# Patient Record
Sex: FEMALE | Race: WHITE
[De-identification: names, ages, dates, MRNs, and addresses within clinical notes are randomized per-mention and may not be internally consistent; named-entity substitution may affect disease eponyms.]

---

## 2017-04-23 ENCOUNTER — HOSPITAL ENCOUNTER (EMERGENCY)
Dept: HOSPITAL 62 - ER | Age: 27
Discharge: HOME | End: 2017-04-23
Payer: COMMERCIAL

## 2017-04-23 VITALS — SYSTOLIC BLOOD PRESSURE: 118 MMHG | DIASTOLIC BLOOD PRESSURE: 74 MMHG

## 2017-04-23 DIAGNOSIS — K02.9: ICD-10-CM

## 2017-04-23 DIAGNOSIS — K08.89: Primary | ICD-10-CM

## 2017-04-23 PROCEDURE — S0119 ONDANSETRON 4 MG: HCPCS

## 2017-04-23 PROCEDURE — 99282 EMERGENCY DEPT VISIT SF MDM: CPT

## 2017-04-23 NOTE — ER DOCUMENT REPORT
HPI





- HPI


Patient complains to provider of: dental pain


Onset: Other - Several days


Onset/Duration: Gradual


Pain Level: 5


Context: 


27-year-old female with dental decay is complaining increased pain in the right 

upper molars.  No fever or chills.  No facial swelling.


Associated Symptoms: None


Exacerbated by: Denies


Relieved by: Denies


Similar symptoms previously: Yes


Recently seen / treated by doctor: No





- ROS


ROS below otherwise negative: Yes


Systems Reviewed and Negative: Yes All other systems reviewed and negative





- DERM


Skin Color: Normal





Past Medical History





- General


Information source: Patient





- Social History


Smoking Status: Never Smoker


Frequency of alcohol use: None


Drug Abuse: None


Family History: Reviewed & Not Pertinent


Patient has suicidal ideation: No


Patient has homicidal ideation: No





- Medical History


Medical History: Negative


Renal/ Medical History: Denies: Hx Peritoneal Dialysis


Surgical Hx: Negative





Vertical Provider Document





- CONSTITUTIONAL


Agree With Documented VS: Yes


Exam Limitations: No Limitations





- INFECTION CONTROL


TRAVEL OUTSIDE OF THE [place] IN LAST 30 DAYS: No





- HEENT


HEENT: Normocephalic


Notes: 


Multiple decayed teeth with severe gingivitis top right molars no abscess 

palpated





- NECK


Neck: Supple.  negative: Lymphadenopathy-Left, Lymphadenopathy-Right





- RESPIRATORY


O2 Sat by Pulse Oximetry: 99





- MUSCULOSKELETAL/EXTREMETIES


Musculoskeletal/Extremeties: MAEW, FROM





- NEURO


Level of Consciousness: Awake, Alert, Appropriate





- DERM


Integumentary: Warm, Dry, No Rash





Course





- Vital Signs


Vital signs: 


 











Temp Pulse Resp BP Pulse Ox


 


 99 F   68   18   118/74   99 


 


 04/23/17 21:02  04/23/17 21:02  04/23/17 21:02  04/23/17 21:02  04/23/17 21:02














Discharge





- Discharge


Clinical Impression: 


 dental pain and decay





Condition: Good


Disposition: HOME, SELF-CARE


Instructions:  Penicillin V K ([facility]), Toothache ([facility]), Ultram ([facility]), Anti-

Inflammatory Medication ([facility]), Dentist


Additional Instructions: 


warm compess


to er if worse


see the dentist as soon as possible





Please complete the patient satisfaction survey if you get one, and return it.. 

If you do not receive a survey,  then you can go to the [facility] website, [email or web address] 

and place your comments about your very good care. Thank you very much. It was 

a pleasure being your medical provider today.


Prescriptions: 


Ibuprofen [Motrin 800 mg Tablet] 800 mg PO Q8HP PRN #30 tablet


 PRN Reason: 


Penicillin V Potassium [Penicillin Vk 500 mg Tablet] 500 mg PO QID #40 tablet

## 2018-02-25 ENCOUNTER — HOSPITAL ENCOUNTER (OUTPATIENT)
Dept: HOSPITAL 62 - RAD | Age: 28
End: 2018-02-25
Attending: PHYSICIAN ASSISTANT
Payer: COMMERCIAL
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
## 2018-02-25 DIAGNOSIS — S49.91XA: Primary | ICD-10-CM
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
## 2018-02-25 NOTE — RADIOLOGY REPORT (SQ)
EXAM DESCRIPTION:  SHOULDER RIGHT 2 OR MORE VIEWS



COMPLETED DATE/TIME:  2/25/2018 6:17 pm



REASON FOR STUDY:  INJURY OF SHOULDER RIGHT



COMPARISON:  None.



NUMBER OF VIEWS:  Three views.



TECHNIQUE:  Internal rotation, external rotation, and Y view images acquired of the right shoulder.



LIMITATIONS:  None.



FINDINGS:  MINERALIZATION: Normal.

BONES: No acute fracture or dislocation.  No worrisome bone lesions.

JOINTS: No dislocation.

VISUALIZED LUNGS AND RIBS: No pneumothorax.  No rib fracture.

SOFT TISSUES: No radiopaque foreign body.

OTHER: No other significant finding.



IMPRESSION:  NO RADIOGRAPHIC EVIDENCE OF ACUTE INJURY.



TECHNICAL DOCUMENTATION:  JOB ID:  [id number]

[id number]

 2011 [facility]- All Rights Reserved



Reading location - IP/workstation name: [email or web address]